# Patient Record
Sex: FEMALE | Race: WHITE | NOT HISPANIC OR LATINO
[De-identification: names, ages, dates, MRNs, and addresses within clinical notes are randomized per-mention and may not be internally consistent; named-entity substitution may affect disease eponyms.]

---
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
## 2019-02-21 ENCOUNTER — RX RENEWAL (OUTPATIENT)
Age: 19
End: 2019-02-21

## 2019-02-26 ENCOUNTER — RX RENEWAL (OUTPATIENT)
Age: 19
End: 2019-02-26

## 2019-05-30 ENCOUNTER — RX RENEWAL (OUTPATIENT)
Age: 19
End: 2019-05-30

## 2019-08-15 ENCOUNTER — RECORD ABSTRACTING (OUTPATIENT)
Age: 19
End: 2019-08-15

## 2019-08-15 ENCOUNTER — APPOINTMENT (OUTPATIENT)
Dept: OBGYN | Facility: CLINIC | Age: 19
End: 2019-08-15
Payer: COMMERCIAL

## 2019-08-15 VITALS
HEIGHT: 65 IN | WEIGHT: 167 LBS | SYSTOLIC BLOOD PRESSURE: 110 MMHG | BODY MASS INDEX: 27.82 KG/M2 | DIASTOLIC BLOOD PRESSURE: 60 MMHG

## 2019-08-15 DIAGNOSIS — Z78.9 OTHER SPECIFIED HEALTH STATUS: ICD-10-CM

## 2019-08-15 DIAGNOSIS — Z80.43 FAMILY HISTORY OF MALIGNANT NEOPLASM OF TESTIS: ICD-10-CM

## 2019-08-15 DIAGNOSIS — Z80.1 FAMILY HISTORY OF MALIGNANT NEOPLASM OF TRACHEA, BRONCHUS AND LUNG: ICD-10-CM

## 2019-08-15 DIAGNOSIS — N94.9 UNSPECIFIED CONDITION ASSOCIATED WITH FEMALE GENITAL ORGANS AND MENSTRUAL CYCLE: ICD-10-CM

## 2019-08-15 DIAGNOSIS — Z01.419 ENCOUNTER FOR GYNECOLOGICAL EXAMINATION (GENERAL) (ROUTINE) W/OUT ABNORMAL FINDINGS: ICD-10-CM

## 2019-08-15 DIAGNOSIS — Z87.2 PERSONAL HISTORY OF DISEASES OF THE SKIN AND SUBCUTANEOUS TISSUE: ICD-10-CM

## 2019-08-15 PROCEDURE — 81025 URINE PREGNANCY TEST: CPT

## 2019-08-15 PROCEDURE — 99395 PREV VISIT EST AGE 18-39: CPT

## 2019-08-15 NOTE — DISCUSSION/SUMMARY
[FreeTextEntry1] : continue with ocps\par \par f/u vaginal cultures- r/o ct .  if cultures negative and irritation persists pt advised to return for re-exam/testing.  the use of vaginal lubricants was also reviewed.

## 2019-08-15 NOTE — HISTORY OF PRESENT ILLNESS
[Reproductive Age] : is of reproductive age [Oral Contraceptive] : uses oral contraception pills [HPV Vaccine ___] : HPV vaccine [unfilled] [Menarche ____ yo] : menarche at [unfilled] yo [Definite:  ___ (Date)] : the last menstrual period was [unfilled] [Sexually Active] : is sexually active [Menarche Age: ____] : age at menarche was [unfilled] [Monogamous] : is monogamous [Contraception] : uses contraception [Oral Contraceptives] : uses oral contraceptives [Male ___] : [unfilled] male [Good] : being in good health [1 Year Ago] : 1 year ago [Healthy Diet] : a healthy diet [Regular Exercise] : regular exercise [No Previous Pap Smear] : no previous Papanicolaou cytology [No Previous Colonoscopy] : no previous colonoscopy [No Previous Mammogram] : no previous mammogram [Last Screen ___] : last STD screen was [unfilled] [Dyspareunia] : dyspareunia [Weight Concerns] : no concerns with her weight [FreeTextEntry8] : vaginal dryness

## 2019-08-15 NOTE — PHYSICAL EXAM
[Awake] : awake [Alert] : alert [Normal] : uterus [Discharge] : a  ~M vaginal discharge was present [Cammie] : yellow [Scant] : scant [Mucoid] : mucoid [Uterine Adnexae] : were not tender and not enlarged [Tender] : no tenderness [Mass] : no breast mass [Axillary LAD] : no axillary lymphadenopathy [Nipple Discharge] : no nipple discharge [Motion Tenderness] : there was no cervical motion tenderness [Enlarged ___ wks] : not enlarged [Tenderness] : nontender [Mass ___ cm] : no uterine mass was palpated

## 2019-08-15 NOTE — COUNSELING
[Breast Self Exam] : breast self exam [STD (testing, results, tx)] : STD (testing, results, tx) [Contraception] : contraception

## 2019-08-16 LAB
HCG UR QL: NEGATIVE
QUALITY CONTROL: YES

## 2019-08-17 LAB
C TRACH RRNA SPEC QL NAA+PROBE: NOT DETECTED
CANDIDA VAG CYTO: NOT DETECTED
G VAGINALIS+PREV SP MTYP VAG QL MICRO: NOT DETECTED
N GONORRHOEA RRNA SPEC QL NAA+PROBE: NOT DETECTED
SOURCE AMPLIFICATION: NORMAL
T VAGINALIS VAG QL WET PREP: NOT DETECTED

## 2019-11-05 RX ORDER — NORGESTIMATE AND ETHINYL ESTRADIOL 7DAYSX3 LO
0.18/0.215/0.25 KIT ORAL DAILY
Qty: 3 | Refills: 2 | Status: ACTIVE | COMMUNITY
Start: 2019-08-15

## 2020-01-17 ENCOUNTER — APPOINTMENT (OUTPATIENT)
Dept: OBGYN | Facility: CLINIC | Age: 20
End: 2020-01-17
Payer: COMMERCIAL

## 2020-01-17 VITALS
WEIGHT: 165 LBS | DIASTOLIC BLOOD PRESSURE: 70 MMHG | SYSTOLIC BLOOD PRESSURE: 116 MMHG | BODY MASS INDEX: 27.49 KG/M2 | HEIGHT: 65 IN

## 2020-01-17 DIAGNOSIS — Z11.3 ENCOUNTER FOR SCREENING FOR INFECTIONS WITH A PREDOMINANTLY SEXUAL MODE OF TRANSMISSION: ICD-10-CM

## 2020-01-17 DIAGNOSIS — Z30.9 ENCOUNTER FOR CONTRACEPTIVE MANAGEMENT, UNSPECIFIED: ICD-10-CM

## 2020-01-17 DIAGNOSIS — N89.8 OTHER SPECIFIED NONINFLAMMATORY DISORDERS OF VAGINA: ICD-10-CM

## 2020-01-17 PROCEDURE — 99213 OFFICE O/P EST LOW 20 MIN: CPT

## 2020-01-17 RX ORDER — AMOXICILLIN 500 MG/1
500 CAPSULE ORAL
Qty: 20 | Refills: 0 | Status: DISCONTINUED | COMMUNITY
Start: 2019-10-29

## 2020-01-17 NOTE — HISTORY OF PRESENT ILLNESS
[___ Month(s) Ago] : [unfilled] month(s) ago [Good] : being in good health [Healthy Diet] : a healthy diet [Regular Exercise] : regular exercise [Contraception] : uses contraception [Up to Date] : up to date with ~his/her~ STD screening [Last Screen ___] : last STD screen was [unfilled] [Menarche Age: ____] : age at menarche was [unfilled] [Sexually Active] : is sexually active [Male ___] : [unfilled] male [Definite:  ___ (Date)] : the last menstrual period was [unfilled] [Weight Concerns] : no concerns with her weight [Menstrual Problems] : reports normal menses [Pregnancy History] : denies prior pregnancies

## 2020-01-17 NOTE — PHYSICAL EXAM
[Labia Majora Erythema] : erythema of the labia majora [Vulvitis] : vulvitis [Labia Minora Erythema] : erythema of the labia minora [Normal] : clitoris [Scant] : scant [Discharge] : a  ~M vaginal discharge was present [White] : white [Curds] : curd-like [de-identified] : small microtear to fourchette - pt advised to apply aquaphor to this area

## 2020-01-18 LAB
CANDIDA VAG CYTO: DETECTED
G VAGINALIS+PREV SP MTYP VAG QL MICRO: DETECTED
T VAGINALIS VAG QL WET PREP: NOT DETECTED

## 2020-01-19 LAB
C TRACH RRNA SPEC QL NAA+PROBE: NOT DETECTED
N GONORRHOEA RRNA SPEC QL NAA+PROBE: NOT DETECTED
SOURCE AMPLIFICATION: NORMAL

## 2020-01-20 ENCOUNTER — RESULT REVIEW (OUTPATIENT)
Age: 20
End: 2020-01-20

## 2020-01-22 ENCOUNTER — APPOINTMENT (OUTPATIENT)
Dept: OBGYN | Facility: CLINIC | Age: 20
End: 2020-01-22
Payer: COMMERCIAL

## 2020-01-22 ENCOUNTER — LABORATORY RESULT (OUTPATIENT)
Age: 20
End: 2020-01-22

## 2020-01-22 VITALS
WEIGHT: 172 LBS | DIASTOLIC BLOOD PRESSURE: 70 MMHG | HEIGHT: 65 IN | SYSTOLIC BLOOD PRESSURE: 112 MMHG | BODY MASS INDEX: 28.66 KG/M2

## 2020-01-22 DIAGNOSIS — R10.2 PELVIC AND PERINEAL PAIN: ICD-10-CM

## 2020-01-22 DIAGNOSIS — L29.2 PRURITUS VULVAE: ICD-10-CM

## 2020-01-22 DIAGNOSIS — N76.6 ULCERATION OF VULVA: ICD-10-CM

## 2020-01-22 DIAGNOSIS — N90.89 OTHER SPECIFIED NONINFLAMMATORY DISORDERS OF VULVA AND PERINEUM: ICD-10-CM

## 2020-01-22 LAB
BILIRUB UR QL STRIP: NORMAL
COLLECTION METHOD: NORMAL
GLUCOSE UR-MCNC: NORMAL
HCG UR QL: 1 EU/DL
HCG UR QL: NEGATIVE
HGB UR QL STRIP.AUTO: NORMAL
KETONES UR-MCNC: NORMAL
LEUKOCYTE ESTERASE UR QL STRIP: ABNORMAL
NITRITE UR QL STRIP: NORMAL
PH UR STRIP: 8.5
PROT UR STRIP-MCNC: NORMAL
QUALITY CONTROL: YES
SP GR UR STRIP: 1.02

## 2020-01-22 PROCEDURE — 99213 OFFICE O/P EST LOW 20 MIN: CPT

## 2020-01-22 PROCEDURE — 36415 COLL VENOUS BLD VENIPUNCTURE: CPT

## 2020-01-22 PROCEDURE — 81003 URINALYSIS AUTO W/O SCOPE: CPT | Mod: QW

## 2020-01-22 PROCEDURE — 81025 URINE PREGNANCY TEST: CPT

## 2020-01-22 NOTE — REVIEW OF SYSTEMS
[Nl] : Hematologic/Lymphatic [Fever] : no fever [Chills] : no chills [Dysuria] : dysuria [Abn Vag Bleeding] : no abnormal vaginal bleeding [Vaginal Pain] : vaginal pain [Vaginal Itching] : vaginal itching [Vaginal Discharge] : vaginal discharge [Vulvar Itching] : vulvar itching [Vulvar Pain] : vulvar pain [Vulvar Lump] : vulvar lump or mass [Labial Swelling] : labial swelling

## 2020-01-30 ENCOUNTER — APPOINTMENT (OUTPATIENT)
Dept: OBGYN | Facility: CLINIC | Age: 20
End: 2020-01-30
Payer: COMMERCIAL

## 2020-01-30 VITALS
SYSTOLIC BLOOD PRESSURE: 102 MMHG | WEIGHT: 167 LBS | DIASTOLIC BLOOD PRESSURE: 74 MMHG | BODY MASS INDEX: 27.82 KG/M2 | HEIGHT: 65 IN

## 2020-01-30 DIAGNOSIS — A60.00 HERPESVIRAL INFECTION OF UROGENITAL SYSTEM, UNSPECIFIED: ICD-10-CM

## 2020-01-30 DIAGNOSIS — A64 UNSPECIFIED SEXUALLY TRANSMITTED DISEASE: ICD-10-CM

## 2020-01-30 PROCEDURE — 99214 OFFICE O/P EST MOD 30 MIN: CPT

## 2020-01-30 NOTE — COUNSELING
[STD (testing, results, tx)] : STD (testing, results, tx) [HIV Postest] : HIV postest [Safe Sexual Practices] : safe sexual practices

## 2020-01-30 NOTE — HISTORY OF PRESENT ILLNESS
[Reproductive Age] : is of reproductive age [Last Screen ___] : last STD screen was [unfilled] [Sexually Active] : is sexually active [Male ___] : [unfilled] male [NA] : N/A

## 2020-02-24 ENCOUNTER — APPOINTMENT (OUTPATIENT)
Dept: OBGYN | Facility: CLINIC | Age: 20
End: 2020-02-24

## 2020-02-24 ENCOUNTER — APPOINTMENT (OUTPATIENT)
Dept: OBGYN | Facility: CLINIC | Age: 20
End: 2020-02-24
Payer: COMMERCIAL

## 2020-02-24 PROCEDURE — 36415 COLL VENOUS BLD VENIPUNCTURE: CPT

## 2020-02-24 NOTE — HISTORY OF PRESENT ILLNESS
[unknown] : the patient is unsure of the date of her LMP [Last Screen ___] : last STD screen was [unfilled] [Male ___] : [unfilled] male [Sexually Active] : is sexually active

## 2020-03-10 LAB — HEPATITIS E IGM ABY: ABNORMAL

## 2020-12-21 PROBLEM — Z01.419 ENCOUNTER FOR WELL WOMAN EXAM WITH ROUTINE GYNECOLOGICAL EXAM: Status: RESOLVED | Noted: 2019-08-15 | Resolved: 2020-12-21

## 2024-02-21 ENCOUNTER — NON-APPOINTMENT (OUTPATIENT)
Age: 24
End: 2024-02-21

## 2024-03-14 ENCOUNTER — APPOINTMENT (OUTPATIENT)
Dept: OTOLARYNGOLOGY | Facility: CLINIC | Age: 24
End: 2024-03-14
Payer: COMMERCIAL

## 2024-03-14 VITALS
BODY MASS INDEX: 23.32 KG/M2 | HEART RATE: 78 BPM | DIASTOLIC BLOOD PRESSURE: 75 MMHG | HEIGHT: 65 IN | WEIGHT: 140 LBS | SYSTOLIC BLOOD PRESSURE: 113 MMHG

## 2024-03-14 DIAGNOSIS — H90.2 CONDUCTIVE HEARING LOSS, UNSPECIFIED: ICD-10-CM

## 2024-03-14 DIAGNOSIS — Q17.2 MICROTIA: ICD-10-CM

## 2024-03-14 PROCEDURE — G2211 COMPLEX E/M VISIT ADD ON: CPT

## 2024-03-14 PROCEDURE — 99204 OFFICE O/P NEW MOD 45 MIN: CPT

## 2024-03-14 PROCEDURE — 92567 TYMPANOMETRY: CPT

## 2024-03-14 PROCEDURE — 92557 COMPREHENSIVE HEARING TEST: CPT

## 2024-03-14 RX ORDER — NORGESTIMATE AND ETHINYL ESTRADIOL 7DAYSX3 LO
0.18/0.215/0.25 KIT ORAL
Qty: 84 | Refills: 2 | Status: DISCONTINUED | COMMUNITY
Start: 2020-01-17 | End: 2024-03-14

## 2024-03-14 RX ORDER — VALACYCLOVIR 500 MG/1
500 TABLET, FILM COATED ORAL TWICE DAILY
Qty: 60 | Refills: 2 | Status: DISCONTINUED | COMMUNITY
Start: 2020-01-30 | End: 2024-03-14

## 2024-03-14 RX ORDER — HYDROCODONE BITARTRATE AND ACETAMINOPHEN 5; 300 MG/1; MG/1
5-300 TABLET ORAL
Qty: 8 | Refills: 0 | Status: DISCONTINUED | COMMUNITY
Start: 2020-01-22 | End: 2024-03-14

## 2024-03-14 RX ORDER — CLOTRIMAZOLE AND BETAMETHASONE DIPROPIONATE 10; .5 MG/G; MG/G
1-0.05 CREAM TOPICAL TWICE DAILY
Qty: 1 | Refills: 1 | Status: DISCONTINUED | COMMUNITY
Start: 2020-01-17 | End: 2024-03-14

## 2024-03-14 RX ORDER — METRONIDAZOLE 7.5 MG/G
0.75 GEL VAGINAL
Qty: 1 | Refills: 0 | Status: DISCONTINUED | COMMUNITY
Start: 2020-01-18 | End: 2024-03-14

## 2024-03-14 RX ORDER — NORGESTIMATE AND ETHINYL ESTRADIOL 7DAYSX3 LO
0.18/0.215/0.25 KIT ORAL
Qty: 3 | Refills: 2 | Status: DISCONTINUED | COMMUNITY
Start: 2019-05-30 | End: 2024-03-14

## 2024-03-14 RX ORDER — VALACYCLOVIR 1 G/1
1 TABLET, FILM COATED ORAL
Qty: 7 | Refills: 0 | Status: DISCONTINUED | COMMUNITY
Start: 2020-01-22 | End: 2024-03-14

## 2024-03-14 RX ORDER — FLUCONAZOLE 150 MG/1
150 TABLET ORAL
Qty: 2 | Refills: 0 | Status: DISCONTINUED | COMMUNITY
Start: 2020-01-17 | End: 2024-03-14

## 2024-03-15 ENCOUNTER — APPOINTMENT (OUTPATIENT)
Dept: ORTHOPEDIC SURGERY | Facility: CLINIC | Age: 24
End: 2024-03-15
Payer: COMMERCIAL

## 2024-03-15 DIAGNOSIS — S83.241A OTHER TEAR OF MEDIAL MENISCUS, CURRENT INJURY, RIGHT KNEE, INITIAL ENCOUNTER: ICD-10-CM

## 2024-03-15 PROCEDURE — 99203 OFFICE O/P NEW LOW 30 MIN: CPT

## 2024-03-15 PROCEDURE — 73562 X-RAY EXAM OF KNEE 3: CPT | Mod: RT

## 2024-03-15 NOTE — IMAGING
[de-identified] : (Exam: Knee)   Laterality is right    Patient is in no acute distress, alert and oriented Sensation is grossly intact to light touch in the foot Motor function is 5/5 in the foot Capillary refill is less than 2 seconds in the toes Lymphadenopathy is not present Peripheral edema is not present   Skin is intact Effusion is not present Atrophy is not present Antalgic gait is not present   Medial joint line tenderness is present Lateral joint line tenderness is present Patellar tenderness is not present Calf tenderness is not present   Knee extension is 0 Knee flexion is 135   Quadriceps strength is 5-/5 Hamstring strength is 5/5   Lachman is equivocal Posterior drawer is normal Varus stress stability is normal Valgus stress stability is normal   Medial Hollis test is abnormal Lateral Hollis test is abnormal Patellofemoral grind test is normal Patellar apprehension test is normal [Right] : right knee [All Views] : anteroposterior, lateral, skyline, and anteroposterior standing [There are no fractures, subluxations or dislocations. No significant abnormalities are seen] : There are no fractures, subluxations or dislocations. No significant abnormalities are seen

## 2024-03-15 NOTE — DISCUSSION/SUMMARY
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 The potential non-surgical and surgical treatments were reviewed. The relative risks and benefits of each option were considered relative to the patients age, activity level, medical history, symptom severity and previously attempted treatments.   The patient was advised to consult with their primary medical provider prior to initiation of any new medications to reduce the risk of adverse effects specific to their long-term home medications and medical history. The risk of gastrointestinal irritation and kidney injury specific to long-term NSAID use was discussed.   -Over the counter NSAID as needed for pain. -There is adequate clinical concern the patient may have a condition that could benefit from orthopedic intervention. An MRI was therefore ordered to evaluate for structural abnormality and further guide treatment recommendations. -Follow up when imaging completed.   (MDM)   Problem Complexity -Moderate: chronic illness with exacerbation   Risk -Low: over the counter medication   -Patient has not been seen by another provider in my practice within the past 2 years who specializes in orthopedic surgery.

## 2024-03-15 NOTE — HISTORY OF PRESENT ILLNESS
[de-identified] : Date of initial evaluation is 03/15/2024 Patient age is 23 year  Occupation is in school and works in fashion  Body part causing symptoms is the right knee  Symptoms began 4 years ago, patient reports she twisted her knee while skiing She went to [facility] at that time and had negative XR She did not seek further orthopedic care She reports persistent knee pain and instability when active Location of pain is lateral  Quality of pain is dull  Pain score at rest is 2/10 Pain score during activity is 5/10 Radicular symptoms are not present Prior treatments include OTC medication  Patient's condition is not associated with workers compensation, no-fault or interscholastic athletics
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
## 2024-03-21 PROBLEM — H90.2 CONDUCTIVE HEARING LOSS, EXTERNAL EAR: Status: ACTIVE | Noted: 2024-03-21

## 2024-03-21 NOTE — HISTORY OF PRESENT ILLNESS
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 Holly form [id number] now referred by Dr. Johnson to address conductive hearing loss AD. No changes in hearing. Intermittent tinnitus AD.  Has increased pressure AU with decreased hearing after flying. No otalgia, otorrhea, ear infections, dizziness/vertigo or headaches related to hearing. No hx of head trauma or exposure to loud noises. No family history of hearing loss.  Last audio last month and no recent imaging.

## 2024-03-21 NOTE — PHYSICAL EXAM
[de-identified] : normal AS [de-identified] : right microtia recon [Midline] : trachea located in midline position [Normal] : cranial nerves 2-12 intact

## 2024-03-21 NOTE — DATA REVIEWED
[de-identified] : AD: Moderate to profound CHL .25-8kHz w/ excellent WRS. CNT tymp due to microtia. AS: Hearing WNL .25-8kHz w/ excellent WRS and type A tymp.

## 2025-06-05 ENCOUNTER — APPOINTMENT (OUTPATIENT)
Dept: OTOLARYNGOLOGY | Facility: CLINIC | Age: 25
End: 2025-06-05